# Patient Record
Sex: FEMALE | Race: WHITE | NOT HISPANIC OR LATINO
[De-identification: names, ages, dates, MRNs, and addresses within clinical notes are randomized per-mention and may not be internally consistent; named-entity substitution may affect disease eponyms.]

---

## 2019-08-06 PROBLEM — Z00.00 ENCOUNTER FOR PREVENTIVE HEALTH EXAMINATION: Status: ACTIVE | Noted: 2019-08-06

## 2019-08-12 ENCOUNTER — APPOINTMENT (OUTPATIENT)
Dept: OBGYN | Facility: CLINIC | Age: 64
End: 2019-08-12
Payer: COMMERCIAL

## 2019-08-12 ENCOUNTER — LABORATORY RESULT (OUTPATIENT)
Age: 64
End: 2019-08-12

## 2019-08-12 ENCOUNTER — OUTPATIENT (OUTPATIENT)
Dept: OUTPATIENT SERVICES | Facility: HOSPITAL | Age: 64
LOS: 1 days | Discharge: HOME | End: 2019-08-12

## 2019-08-12 VITALS — WEIGHT: 157 LBS | BODY MASS INDEX: 27.82 KG/M2 | HEIGHT: 63 IN

## 2019-08-12 PROCEDURE — 99397 PER PM REEVAL EST PAT 65+ YR: CPT

## 2019-08-14 ENCOUNTER — LABORATORY RESULT (OUTPATIENT)
Age: 64
End: 2019-08-14

## 2019-08-14 DIAGNOSIS — Z01.419 ENCOUNTER FOR GYNECOLOGICAL EXAMINATION (GENERAL) (ROUTINE) WITHOUT ABNORMAL FINDINGS: ICD-10-CM

## 2019-09-07 ENCOUNTER — APPOINTMENT (OUTPATIENT)
Dept: OBGYN | Facility: CLINIC | Age: 64
End: 2019-09-07
Payer: COMMERCIAL

## 2019-09-07 PROCEDURE — 77085 DXA BONE DENSITY AXL VRT FX: CPT

## 2020-08-18 ENCOUNTER — APPOINTMENT (OUTPATIENT)
Dept: OBGYN | Facility: CLINIC | Age: 65
End: 2020-08-18

## 2020-10-26 ENCOUNTER — LABORATORY RESULT (OUTPATIENT)
Age: 65
End: 2020-10-26

## 2020-10-27 ENCOUNTER — APPOINTMENT (OUTPATIENT)
Dept: OBGYN | Facility: CLINIC | Age: 65
End: 2020-10-27
Payer: MEDICARE

## 2020-10-27 VITALS — BODY MASS INDEX: 26.58 KG/M2 | TEMPERATURE: 97.7 F | WEIGHT: 150 LBS | HEIGHT: 63 IN

## 2020-10-27 LAB
BILIRUB UR QL STRIP: NORMAL
CLARITY UR: CLEAR
GLUCOSE UR-MCNC: NORMAL
HCG UR QL: NORMAL EU/DL
HGB UR QL STRIP.AUTO: NORMAL
KETONES UR-MCNC: NORMAL
LEUKOCYTE ESTERASE UR QL STRIP: NORMAL
NITRITE UR QL STRIP: NORMAL
PH UR STRIP: 6.5
PROT UR STRIP-MCNC: NORMAL
SP GR UR STRIP: 1.02

## 2020-10-27 PROCEDURE — 81003 URINALYSIS AUTO W/O SCOPE: CPT | Mod: QW

## 2020-10-27 PROCEDURE — G0101: CPT

## 2020-11-03 ENCOUNTER — NON-APPOINTMENT (OUTPATIENT)
Age: 65
End: 2020-11-03

## 2020-11-09 ENCOUNTER — NON-APPOINTMENT (OUTPATIENT)
Age: 65
End: 2020-11-09

## 2020-11-15 ENCOUNTER — TRANSCRIPTION ENCOUNTER (OUTPATIENT)
Age: 65
End: 2020-11-15

## 2021-01-16 ENCOUNTER — NON-APPOINTMENT (OUTPATIENT)
Age: 66
End: 2021-01-16

## 2021-10-08 ENCOUNTER — TRANSCRIPTION ENCOUNTER (OUTPATIENT)
Age: 66
End: 2021-10-08

## 2021-11-09 ENCOUNTER — TRANSCRIPTION ENCOUNTER (OUTPATIENT)
Age: 66
End: 2021-11-09

## 2021-11-09 ENCOUNTER — APPOINTMENT (OUTPATIENT)
Dept: OBGYN | Facility: CLINIC | Age: 66
End: 2021-11-09
Payer: MEDICARE

## 2021-11-09 ENCOUNTER — NON-APPOINTMENT (OUTPATIENT)
Age: 66
End: 2021-11-09

## 2021-11-09 VITALS — HEIGHT: 63 IN | TEMPERATURE: 97.7 F | WEIGHT: 158 LBS | BODY MASS INDEX: 28 KG/M2

## 2021-11-09 DIAGNOSIS — N39.3 STRESS INCONTINENCE (FEMALE) (MALE): ICD-10-CM

## 2021-11-09 PROCEDURE — 99213 OFFICE O/P EST LOW 20 MIN: CPT

## 2021-11-09 PROCEDURE — 77085 DXA BONE DENSITY AXL VRT FX: CPT

## 2021-11-10 PROBLEM — N39.3 URINARY, INCONTINENCE, STRESS FEMALE: Status: ACTIVE | Noted: 2021-11-10

## 2021-12-13 ENCOUNTER — NON-APPOINTMENT (OUTPATIENT)
Age: 66
End: 2021-12-13

## 2021-12-23 ENCOUNTER — APPOINTMENT (OUTPATIENT)
Dept: PLASTIC SURGERY | Facility: CLINIC | Age: 66
End: 2021-12-23
Payer: MEDICARE

## 2021-12-23 VITALS — HEIGHT: 63 IN | WEIGHT: 155 LBS | BODY MASS INDEX: 27.46 KG/M2

## 2021-12-23 DIAGNOSIS — Z78.9 OTHER SPECIFIED HEALTH STATUS: ICD-10-CM

## 2021-12-23 PROCEDURE — 99203 OFFICE O/P NEW LOW 30 MIN: CPT

## 2021-12-23 RX ORDER — MULTIVIT-MIN/IRON/FOLIC ACID/K 18-600-40
CAPSULE ORAL
Refills: 0 | Status: ACTIVE | COMMUNITY

## 2021-12-23 RX ORDER — IBUPROFEN 200 MG
600 CAPSULE ORAL
Refills: 0 | Status: ACTIVE | COMMUNITY

## 2021-12-23 RX ORDER — FLUOXETINE HCL 10 MG
TABLET ORAL
Refills: 0 | Status: ACTIVE | COMMUNITY

## 2021-12-23 NOTE — HISTORY OF PRESENT ILLNESS
[FreeTextEntry1] : 66 year old woman here for right palmar soft tissue mild thickening over 3rd MC\par \par fell three times over past numerous months and landed on outstretched hands\par \par nonsmoker\par nondiabetic\par \par retired teacher

## 2021-12-23 NOTE — ASSESSMENT
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  \par Masks were used by the health care providers and staff and the examination room was cleaned after the patient visit was completed.\par

## 2022-01-26 ENCOUNTER — TRANSCRIPTION ENCOUNTER (OUTPATIENT)
Age: 67
End: 2022-01-26

## 2022-06-30 ENCOUNTER — APPOINTMENT (OUTPATIENT)
Dept: PLASTIC SURGERY | Facility: CLINIC | Age: 67
End: 2022-06-30

## 2022-06-30 PROCEDURE — 99212 OFFICE O/P EST SF 10 MIN: CPT

## 2022-06-30 NOTE — PHYSICAL EXAM
[NI] : Normal [de-identified] : mild early Dupuytrens contracture over 3rd MC of right hand.  NORMAL TABLETOP TEST   No issues with hand use  No problem w any activity\par Left Hand fine, no contracture at all

## 2022-08-06 ENCOUNTER — NON-APPOINTMENT (OUTPATIENT)
Age: 67
End: 2022-08-06

## 2022-08-07 ENCOUNTER — EMERGENCY (EMERGENCY)
Facility: HOSPITAL | Age: 67
LOS: 0 days | Discharge: HOME | End: 2022-08-07
Attending: STUDENT IN AN ORGANIZED HEALTH CARE EDUCATION/TRAINING PROGRAM | Admitting: STUDENT IN AN ORGANIZED HEALTH CARE EDUCATION/TRAINING PROGRAM

## 2022-08-07 VITALS
HEART RATE: 63 BPM | OXYGEN SATURATION: 97 % | TEMPERATURE: 97 F | WEIGHT: 154.98 LBS | SYSTOLIC BLOOD PRESSURE: 197 MMHG | DIASTOLIC BLOOD PRESSURE: 91 MMHG | RESPIRATION RATE: 18 BRPM

## 2022-08-07 DIAGNOSIS — Z88.0 ALLERGY STATUS TO PENICILLIN: ICD-10-CM

## 2022-08-07 DIAGNOSIS — M25.472 EFFUSION, LEFT ANKLE: ICD-10-CM

## 2022-08-07 PROCEDURE — 93970 EXTREMITY STUDY: CPT | Mod: 26

## 2022-08-07 PROCEDURE — 99284 EMERGENCY DEPT VISIT MOD MDM: CPT | Mod: FS

## 2022-08-07 NOTE — ED PROVIDER NOTE - NS ED ATTENDING STATEMENT MOD
This was a shared visit with the CODY. I reviewed and verified the documentation and independently performed the documented:

## 2022-08-07 NOTE — ED PROVIDER NOTE - ATTENDING APP SHARED VISIT CONTRIBUTION OF CARE
66-year-old female no past medical history presents with left ankle swelling.  Patient just got off of a 9-hour plane ride today, noted mild left ankle swelling.  No fever/chills, no chest pain, no shortness of breath, no abdominal pain, no nausea/vomiting/diarrhea, no trauma, no dizziness, no lightheadedness, no palpitations, no skin changes, no history of PE/DVT, no smoking history, no recent surgery, no hormone use.    CONSTITUTIONAL: Well-developed; well-nourished; in no acute distress. Sitting up and providing appropriate history and physical examination  SKIN: skin exam is warm and dry, no acute rash.  HEAD: Normocephalic; atraumatic.  EYES: PERRL, 3 mm bilateral, no nystagmus, EOM intact; conjunctiva and sclera clear.  ENT: No nasal discharge; airway clear.  NECK: Supple; non tender. + full passive ROM in all directions. No JVD  EXT: + Mild swelling over left ankle.  Normal ROM. No clubbing, cyanosis. Dp and Pt Pulses intact. Cap refill less than 3 seconds  NEURO: CN 2-12 intact, normal finger to nose, normal romberg, stable gait, no sensory or motor deficits, Alert, oriented, grossly unremarkable. No Focal deficits. GCS 15. NIH 0  PSYCH: Cooperative, appropriate.

## 2022-08-07 NOTE — ED PROVIDER NOTE - NS ED ROS FT
Constitutional: (-) fever, (-) chills  Eyes: (-) visual changes  ENT: (-) nasal congestions  Cardiovascular: (-) chest pain, (-) syncope  Respiratory: (-) cough, (-) shortness of breath, (-) dyspnea,   Gastrointestinal: (-) vomiting, (-) diarrhea, (-)nausea,  Musculoskeletal: (-) neck pain, (-) back pain, (-) joint pain,  Integumentary: (-) rash, (+) edema, (-) bruises  Neurological: (-) headache, (-) loc, (-) dizziness, (-) tingling, (-)numbness,  Peripheral Vascular: (-) leg swelling  :  (-)dysuria,  (-) hematuria  Allergic/Immunologic: (-) pruritus

## 2022-08-07 NOTE — ED PROVIDER NOTE - PATIENT PORTAL LINK FT
You can access the FollowMyHealth Patient Portal offered by [facility] by registering at the following website: [email or web address]. By joining [facility]’s FollowMyHealth portal, you will also be able to view your health information using other applications (apps) compatible with our system.

## 2022-08-07 NOTE — ED PROVIDER NOTE - PHYSICAL EXAMINATION
Physical Exam    Vital Signs: I have reviewed the initial vital signs.  Constitutional: appears stated age, no acute distress  Eyes: Conjunctiva pink, Sclera clear  Musculoskeletal: from of b/l ankles  Integumentary: ankle left mild swelling   Neurologic: awake, alert, nvi

## 2022-08-07 NOTE — ED PROVIDER NOTE - OBJECTIVE STATEMENT
67 yo female, no pmh, presents to ed for mild swelling to left ankle, no pain or radiation. had 9 hr plane ride today. Denies fever, chills, sob, abd pain, nvd, dizziness, syncope, cp.

## 2022-08-07 NOTE — ED ADULT NURSE NOTE - PRO INTERPRETER NEED 2
Bedside and Verbal shift change report given to [address] [place], [place] [address]way [phone] (oncoming nurse) by Khurram Wheeler (offgoing nurse). Report included the following information SBAR, Kardex and MAR.  
 
1130-patient discharged home with family at this time. Discharge instructions given and gone over, no further questions at this time. Appointment made for tomorrow morning with  English

## 2022-08-07 NOTE — ED PROVIDER NOTE - CLINICAL SUMMARY MEDICAL DECISION MAKING FREE TEXT BOX
I personally evaluated the patient. I reviewed the Resident´s or Physician Assistant´s note (as assigned above), and agree with the findings and plan except as documented in my note.  Patient evaluated for ankle swelling.  Duplex performed in the ED, prelim duplex negative for DVT.  Patient ambulatory in the ED with steady gait, neurovascularly intact.  I have fully discussed the medical management and delivery of care with the patient. I have discussed any available labs, imaging and treatment options with the patient. Patient confirms understanding and has been given detailed return precautions. Patient instructed to return to the ED should symptoms persist or worsen. Patient has demonstrated capacity and has verbalized understanding. Patient is well appearing upon discharge.

## 2022-08-07 NOTE — ED ADULT TRIAGE NOTE - CHIEF COMPLAINT QUOTE
just came off 9 hour flight, went to urgent care to get covid swabbed and told to come to ed for right swollen ankle

## 2022-08-17 ENCOUNTER — NON-APPOINTMENT (OUTPATIENT)
Age: 67
End: 2022-08-17

## 2022-09-20 ENCOUNTER — APPOINTMENT (OUTPATIENT)
Dept: ORTHOPEDIC SURGERY | Facility: CLINIC | Age: 67
End: 2022-09-20

## 2022-09-20 VITALS — BODY MASS INDEX: 27.23 KG/M2 | HEIGHT: 62 IN | WEIGHT: 148 LBS

## 2022-09-20 DIAGNOSIS — Z78.9 OTHER SPECIFIED HEALTH STATUS: ICD-10-CM

## 2022-09-20 PROCEDURE — 99213 OFFICE O/P EST LOW 20 MIN: CPT

## 2022-09-20 PROCEDURE — 73610 X-RAY EXAM OF ANKLE: CPT | Mod: RT

## 2022-09-20 PROCEDURE — 73630 X-RAY EXAM OF FOOT: CPT | Mod: RT

## 2022-09-20 PROCEDURE — 99203 OFFICE O/P NEW LOW 30 MIN: CPT

## 2022-09-20 RX ORDER — NABUMETONE 500 MG/1
500 TABLET, FILM COATED ORAL
Qty: 60 | Refills: 0 | Status: ACTIVE | COMMUNITY
Start: 2022-09-20 | End: 1900-01-01

## 2022-09-20 NOTE — IMAGING
[de-identified] :   On examination of her right foot and ankle she has swelling and ecchymosis over the lateral aspect of the ankle.  She has some ecchymosis around the 1st MTP joint of the toe.  She is tender over the lateral malleolus, ATFL and CFL.  She is nontender over the medial malleolus or the deltoid ligament.  She is nontender over the talar dome.  She is nontender over the midfoot or metatarsals.  She is nontender over the Lisfranc joint.  She has tenderness at the 1st MTP joint.  She is nontender palpation of the toes.  She can dorsiflex and plantar flex ankle but has some decreased range of motion.  No tenderness over the Achilles, negative Valdez's test, no calf tenderness.  Sensation is intact throughout, 2+ DP and PT pulses.\par \par X-rays taken in the office today of the right ankle, weight-bearing, show a nondisplaced Kovacs B lateral malleolus fracture.  No widening of the medial clear space or the syndesmosis.  X-rays taken of the right foot, weight-bearing, show degenerative changes at the 1st MTP joint.  No acute fractures, dislocations, or other bony abnormalities noted.\par \par

## 2022-09-20 NOTE — DISCUSSION/SUMMARY
[de-identified] : At this time I placed her in a tall Cam walker boot, I discussed with her for now she should remain nonweightbearing.  She states she is not able to use crutches and refused them.  I gave her a cane instead.  I discussed with her giving her script for a knee walker, she states that a friend has 1 and she is going to bring it to her tomorrow.  I discussed with her to keep the foot up and elevated, ice, Tylenol or Motrin as needed for pain.  Will see her back in 2 weeks for repeat x-rays and evaluation to  make sure there is no shift or change in the alignment of the fracture. Patient will call me if any other problems or concerns.  Patient verbalized understanding and agreed with the plan, all questions were answered in the office today.\par

## 2022-09-20 NOTE — HISTORY OF PRESENT ILLNESS
[de-identified] : 58-year-old female is here today for evaluation of her right ankle and foot.  Patient states yesterday she missed a step and fell injuring her right ankle.  She states the pain is mostly to the ankle and she is also having pain near the right great toe.  She is having difficulty walking due to the pain.  She denies any previous injuries or surgeries on this foot or ankle.  She denies any numbness tingling in the foot or any calf pain.

## 2022-09-30 ENCOUNTER — APPOINTMENT (OUTPATIENT)
Dept: ORTHOPEDIC SURGERY | Facility: CLINIC | Age: 67
End: 2022-09-30

## 2022-09-30 PROCEDURE — 73630 X-RAY EXAM OF FOOT: CPT | Mod: RT

## 2022-09-30 PROCEDURE — 73610 X-RAY EXAM OF ANKLE: CPT | Mod: RT

## 2022-09-30 PROCEDURE — 27786 TREATMENT OF ANKLE FRACTURE: CPT

## 2022-09-30 PROCEDURE — 99213 OFFICE O/P EST LOW 20 MIN: CPT | Mod: 25

## 2022-09-30 NOTE — HISTORY OF PRESENT ILLNESS
[de-identified] :  58-year-old woman referred to my office for further management of a nondisplaced right lateral malleolar ankle fracture sustained as result of a twisting injury on September 19, 2022.  Seen initially in our walk-in clinic she was placed in a Cam boot and referred to my office for management.  She is abide by nonweightbearing precautions.  Notes pain is improved.  Does not require regular pain medication but occasionally has discomfort which is managed with over-the-counter NSAIDs and or Tylenol.  No prior history of ankle problems or prior fracture.  She has no sensory complaints.

## 2022-09-30 NOTE — ASSESSMENT
[FreeTextEntry1] :   This 66-year-old woman status post twisting injury resulting in a Kovacs B right lateral malleolar ankle fracture which seems to be relatively stable.  Continue with Cam boot.  As pain subsides concerned to put some weight on her ankle through the boot.  Will have her follow up in my office in 4-5 weeks for repeat evaluation radiographs of her right ankle.  If she is developing increasing pain or problems she is instructed to return to the office sooner for repeat radiographs.  Any problems or happy to see her back.  All questions were answered to his satisfaction.

## 2022-09-30 NOTE — IMAGING
[de-identified] : Pleasant late middle-aged woman was able get onto and off the exam table without assistance abide by nonweightbearing precautions.  She is in no distress.\par \par Physical examination:\par Right ankle:  Moderate swelling over lateral aspect of her ankle.  No medial-sided joint line tenderness.  Resolving ecchymosis.  Midfoot remains supple.  Normal light sensation dorsal plantar aspect of her ankle.  Range of motion ankle somewhat limited secondary to discomfort about her lateral malleolus.  Negative anterior drawer test.  Unable to get a good inversion eversion evaluation secondary to tenderness.\par \par Radiographs:\par Right ankle (AP, lateral, mortise):  Nondisplaced right lateral malleolar ankle fracture.\par \par Right ankle weight-bearing (AP, lateral, mortise, stress):  Above-noted fracture.  No significant change in alignment.  Medial clear space is maintained with weight-bearing.  Appropriate tib-fib overlap on weight-bearing.  Stress radiographs does not demonstrate significant medial clear space widening.

## 2022-10-28 ENCOUNTER — APPOINTMENT (OUTPATIENT)
Dept: ORTHOPEDIC SURGERY | Facility: CLINIC | Age: 67
End: 2022-10-28

## 2022-10-28 PROCEDURE — 99024 POSTOP FOLLOW-UP VISIT: CPT

## 2022-10-28 PROCEDURE — 73610 X-RAY EXAM OF ANKLE: CPT | Mod: RT

## 2022-10-28 NOTE — ASSESSMENT
[FreeTextEntry1] :   66-year-old woman with a minimally displaced right lateral malleolar ankle fracture now just shy of 6 weeks non operative management.  I think on Monday when she reaches 6 weeks she can start to officially transition out of the Cam boot.  At that time I would recommend physical therapy.  Physical therapy will work on gait training balance ankle strengthening and generalized conditioning.  Modalities such as moist heat cryotherapy E stim etc can be utilized as an adjunct therapy.  If she has any discomfort I would treat this with over-the-counter NSAIDs (e.g. Advil ibuprofen Aleve) or Tylenol.  NSAID precautions discussed.  Will have her follow up in my office in 6 weeks time for repeat evaluation with radiographs of her right ankle.  Any problems I am happy to see her back sooner.

## 2022-10-28 NOTE — IMAGING
[de-identified] : Pleasant late middle-aged woman sits comfortably in my office in no distress.\par \par Physical examination:\par Right ankle:  Tenderness palpation is noted over the fracture site.  No medial-sided tenderness.  No undue swelling.  Ankle motion demonstrates 10° of dorsiflexion 40° of plantar flexion 15° eversion 20° inversion.  Midfoot is supple and arch is maintained.  No callosities ulcers.  She does have tenderness palpation over the 1st metatarsophalangeal joint plantarly.  Rich good capillary refill with 2+ DP pulses.  Normal light sensation about her foot.  No callosities ulcers.\par \par Radiographs:\par Weight-bearing right ankle (AP, lateral, mortise):.  Minimally displaced Kovacs B right lateral malleolar ankle fracture.  Talus sits within mortise appropriately.  Appropriate tib-fib overlap.

## 2022-10-28 NOTE — HISTORY OF PRESENT ILLNESS
[de-identified] :  66-year-old woman returns for interval follow-up of a right lateral malleolar ankle fracture sustained as result of a twisting injury on September 19th 2022.  She admits that she start to put weight on her foot without the boot.  For the most part she has been walking with the boot.  She still has a little bit of discomfort.  She is eager to drive.  She requires no regular pain medication.  She has no sensory complaints.  
never

## 2022-11-13 ENCOUNTER — LABORATORY RESULT (OUTPATIENT)
Age: 67
End: 2022-11-13

## 2022-11-14 ENCOUNTER — APPOINTMENT (OUTPATIENT)
Dept: OBGYN | Facility: CLINIC | Age: 67
End: 2022-11-14

## 2022-11-14 VITALS — WEIGHT: 146 LBS | BODY MASS INDEX: 25.87 KG/M2 | TEMPERATURE: 97.9 F | HEIGHT: 63 IN

## 2022-11-14 DIAGNOSIS — Z01.419 ENCOUNTER FOR GYNECOLOGICAL EXAMINATION (GENERAL) (ROUTINE) W/OUT ABNORMAL FINDINGS: ICD-10-CM

## 2022-11-14 PROCEDURE — G0101: CPT

## 2022-11-16 PROBLEM — Z01.419 WELL WOMAN EXAM: Status: ACTIVE | Noted: 2019-08-12

## 2022-12-16 ENCOUNTER — APPOINTMENT (OUTPATIENT)
Dept: ORTHOPEDIC SURGERY | Facility: CLINIC | Age: 67
End: 2022-12-16

## 2022-12-16 DIAGNOSIS — S82.64XA NONDISPLACED FRACTURE OF LATERAL MALLEOLUS OF RIGHT FIBULA, INITIAL ENCOUNTER FOR CLOSED FRACTURE: ICD-10-CM

## 2022-12-16 PROCEDURE — 73610 X-RAY EXAM OF ANKLE: CPT | Mod: RT

## 2022-12-16 PROCEDURE — 99024 POSTOP FOLLOW-UP VISIT: CPT

## 2022-12-16 NOTE — HISTORY OF PRESENT ILLNESS
[de-identified] :  A 58-year-old woman returns for interval follow-up non operatively managed right lateral malleolar ankle fracture sustained on September 19, 2022. Patient complete a course of physical therapy is largely resume full activities.  She takes no regular pain medication.  She is able walk without assistive devices.  She seems reasonably happy with the result.  She has questions about how much she has healed on her x-rays.\par

## 2022-12-16 NOTE — IMAGING
[de-identified] : Elham middle-aged woman sits and stands comfortably my office.  She was able get onto and off of the exam table without assistance.  She walks with no antalgia.\par \par Physical examination:\par Right ankle:  No tenderness palpation over the fracture.  No tenderness along the bony problems of her ankle in general.  No tenderness along the joint line.  Ankle motion is restored to normal with 20° of dorsiflexion 40° of plantar flexion 20° of inversion and 20° eversion.  Arch is maintained in midfoot remains supple.  Normal light sensation dorsal plantar aspects foot.  Some mild hallux valgus deformity without callosities or ulcers.\par \par Radiographs:\par Right ankle (AP, lateral, mortise):  Kovacs B right lateral malleolar ankle fracture which is largely healed.  Talus sits within mortise.  Joint space preserved.  Appropriate tib-fib overlap on AP radiograph.  No evidence of subluxation or posttraumatic arthritis at this time.

## 2022-12-16 NOTE — ASSESSMENT
[FreeTextEntry1] :   67-year-old woman status post twisting injury resulting in a Kovacs B right lateral malleolar ankle fracture now 3 months non operative management was seems of largely healed.  At this point time not much I can offer this patient.  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  All questions answered to her satisfaction.  Patient seems reasonably satisfied with result. No

## 2023-06-29 ENCOUNTER — APPOINTMENT (OUTPATIENT)
Dept: PLASTIC SURGERY | Facility: CLINIC | Age: 68
End: 2023-06-29

## 2023-11-16 ENCOUNTER — APPOINTMENT (OUTPATIENT)
Dept: OBGYN | Facility: CLINIC | Age: 68
End: 2023-11-16
Payer: MEDICARE

## 2023-11-16 VITALS — WEIGHT: 145 LBS | TEMPERATURE: 97.3 F | HEIGHT: 62 IN | BODY MASS INDEX: 26.68 KG/M2

## 2023-11-16 DIAGNOSIS — R35.1 NOCTURIA: ICD-10-CM

## 2023-11-16 DIAGNOSIS — F41.9 ANXIETY DISORDER, UNSPECIFIED: ICD-10-CM

## 2023-11-16 DIAGNOSIS — Z78.0 ASYMPTOMATIC MENOPAUSAL STATE: ICD-10-CM

## 2023-11-16 PROCEDURE — 99213 OFFICE O/P EST LOW 20 MIN: CPT

## 2023-11-27 ENCOUNTER — APPOINTMENT (OUTPATIENT)
Dept: OBGYN | Facility: CLINIC | Age: 68
End: 2023-11-27

## 2024-04-29 ENCOUNTER — NON-APPOINTMENT (OUTPATIENT)
Age: 69
End: 2024-04-29

## 2024-11-23 NOTE — DISCUSSION/SUMMARY
[FreeTextEntry1] : 68 YEAR OLD FEMALE LMP 2008 PRESENTS FOR WELL WOMAN GYNECOLOGIC EXAMINATION AND PAP..  LAST SEEN FOR WELL WOMAN VISIT 11/14/2022; PAP AND HPV HR TESTING DONE AT THAT TIME WERE NEGATIVE.  LAST GYNECOLOGIC VISIT 11/16/2023. NO NEW MEDICAL OR SURGICAL HISTORY SINCE LAST VISIT. MEDICATIONS; PROZAC 40 MG -UNCHNAGED MEDICATION  ALLERGIES; [id number] AND  AMOXICILLIN- BODAY RASH ROS;BMS-NORMAL; NO FAM HISTORY FO COLON CANCER; LAST COLONOSCOPY DONE 2 YRS          NO URINARY COMPLAINTS          SEXUALLY ACTIVE -NO PENILE VAGINAL INTERCOURSE BREASTS; DOES NOT DO BREAST SELF EXAMINATION AS PREVIOUSLY ADIVSED                   LAST MAMMOGRPAHY 12/2023 BIRADS I                   NO FAM HISTORY OF BREAST CANCER + EXERCISES A LITTLE; MUTLIVITAMINS; + LORENA. SUPPLEMENT; + DAIRY; NO TOB/VAPE FRACTURE ;HISTORY; RT ANKLE NO FAM HISTORY OF OSTEOPOROSIS LAST BONE DENSITY TESTING DOEN 3/25/2024 NORMAL  PE;/64; TEMP 98.0; WEIGHT 150 LBS HEIGHT 5'3"      BREASTS; NO MASSES OR DISCHARGES      ABDOMEN;SOFT, NO MASSES; NO TENDERNESS TO PALPATION     PELVIC; NORMAL EXTERNAL GENITALIA                    NROMAL URETHRAL MEATUS                    NORMAL LABIA MAJORA AND LABIA MINORA                    NORMAL VAGINA                     NORMAL CERVIX                   3RD DEGREE RETROVERTED NORMAL UTERUS ON BIMANUAL EXAMINATION                    NO PALPABLE ADNEXAL MASSES  IMP; WELL WOMAN          MENOPAUSE          ANXIETY  PLAN; THIN PREP PAP WITH HR HPV TESTING DONE-PT ADVISED TO CALL IN 2 WKS -RESULTS             BREAST SELF EXAMINATION MONTHLY CONTINUED TO BE STRONGLY ADVISED             SCREENING MAMMOGRPAHY ANNUALLY ADVISED AND REFERRAL FOR 12/2024 GIVEN             RETURN TO OFFICE ONE YEAR FOR GYNECOLOGIC EXAMINATION OR RTO PRN